# Patient Record
Sex: FEMALE | Race: AMERICAN INDIAN OR ALASKA NATIVE
[De-identification: names, ages, dates, MRNs, and addresses within clinical notes are randomized per-mention and may not be internally consistent; named-entity substitution may affect disease eponyms.]

---

## 2018-10-10 ENCOUNTER — HOSPITAL ENCOUNTER (EMERGENCY)
Dept: HOSPITAL 5 - ED | Age: 28
LOS: 1 days | Discharge: HOME | End: 2018-10-11
Payer: SELF-PAY

## 2018-10-10 VITALS — SYSTOLIC BLOOD PRESSURE: 125 MMHG | DIASTOLIC BLOOD PRESSURE: 61 MMHG
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
## 2018-10-10 DIAGNOSIS — S00.83XA: Primary | ICD-10-CM
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
## 2018-10-10 DIAGNOSIS — F17.200: ICD-10-CM

## 2018-10-10 DIAGNOSIS — F12.10: ICD-10-CM

## 2018-10-10 DIAGNOSIS — S80.02XA: ICD-10-CM

## 2018-10-10 PROCEDURE — 72100 X-RAY EXAM L-S SPINE 2/3 VWS: CPT

## 2018-10-10 PROCEDURE — 81025 URINE PREGNANCY TEST: CPT

## 2018-10-10 PROCEDURE — 70486 CT MAXILLOFACIAL W/O DYE: CPT

## 2018-10-10 PROCEDURE — 70450 CT HEAD/BRAIN W/O DYE: CPT
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
## 2019-04-30 ENCOUNTER — HOSPITAL ENCOUNTER (OUTPATIENT)
Dept: HOSPITAL 5 - OR | Age: 29
Discharge: HOME | End: 2019-04-30
Attending: OBSTETRICS & GYNECOLOGY
Payer: MEDICAID

## 2019-04-30 VITALS — DIASTOLIC BLOOD PRESSURE: 57 MMHG | SYSTOLIC BLOOD PRESSURE: 121 MMHG

## 2019-04-30 DIAGNOSIS — O02.1: Primary | ICD-10-CM

## 2019-04-30 DIAGNOSIS — Z98.890: ICD-10-CM

## 2019-04-30 DIAGNOSIS — Z91.040: ICD-10-CM

## 2019-04-30 DIAGNOSIS — Z79.899: ICD-10-CM

## 2019-04-30 DIAGNOSIS — F17.210: ICD-10-CM

## 2019-04-30 LAB
BASOPHILS # (AUTO): 0.1 K/MM3 (ref 0–0.1)
BASOPHILS NFR BLD AUTO: 0.8 % (ref 0–1.8)
EOSINOPHIL # BLD AUTO: 0.1 K/MM3 (ref 0–0.4)
EOSINOPHIL NFR BLD AUTO: 0.7 % (ref 0–4.3)
HCT VFR BLD CALC: 42 % (ref 30.3–42.9)
HGB BLD-MCNC: 13.8 GM/DL (ref 10.1–14.3)
LYMPHOCYTES # BLD AUTO: 1.7 K/MM3 (ref 1.2–5.4)
LYMPHOCYTES NFR BLD AUTO: 20.8 % (ref 13.4–35)
MCHC RBC AUTO-ENTMCNC: 33 % (ref 30–34)
MCV RBC AUTO: 89 FL (ref 79–97)
MONOCYTES # (AUTO): 0.7 K/MM3 (ref 0–0.8)
MONOCYTES % (AUTO): 8.4 % (ref 0–7.3)
PLATELET # BLD: 244 K/MM3 (ref 140–440)
RBC # BLD AUTO: 4.7 M/MM3 (ref 3.65–5.03)

## 2019-04-30 PROCEDURE — 59820 CARE OF MISCARRIAGE: CPT

## 2019-04-30 PROCEDURE — 86900 BLOOD TYPING SEROLOGIC ABO: CPT

## 2019-04-30 PROCEDURE — 86901 BLOOD TYPING SEROLOGIC RH(D): CPT

## 2019-04-30 PROCEDURE — 85025 COMPLETE CBC W/AUTO DIFF WBC: CPT

## 2019-04-30 PROCEDURE — 88305 TISSUE EXAM BY PATHOLOGIST: CPT

## 2019-04-30 PROCEDURE — 36415 COLL VENOUS BLD VENIPUNCTURE: CPT

## 2019-04-30 NOTE — ANESTHESIA CONSULTATION
Anesthesia Consult and Med Hx


Date of service: 04/30/19





- Airway


Anesthetic Teeth Evaluation: Good


ROM Head & Neck: Adequate


Mental/Hyoid Distance: Adequate


Mallampati Class: Class II


Intubation Access Assessment: Good





- Pulmonary Exam


CTA: Yes





- Cardiac Exam


Cardiac Exam: RRR





- Pre-Operative Health Status


ASA Pre-Surgery Classification: ASA2


Proposed Anesthetic Plan: General





- Pulmonary


Hx Smoking: Yes





- Central Nervous System


Hx Psychiatric Problems: No





- Other Systems


Hx Cancer: No

## 2019-04-30 NOTE — OPERATIVE REPORT
Operative Report


Operative Report: 

















DATE OF SURGERY: 19








PREOP Diagnosis


1. Missed 


2. Rh positive





PREOP Diagnosis


1. Missed 


2. Rh positive





Procedure: Suction Dilatation and Curettage





Findings


1. Anteverted uterus 8 wk size





Surgeon


1. Ronna Blanc MD





Anesthesia:


1. General





I/O:


EBL: 100ml


UOP: 5ml








Specimens removed:


1. Products of conception





Complications: none





Disposition: Patient taken to recovery room in stable condition





INDICATIONS:


The patient is a 29yo  that presented for suction D&C for a 6 week missed 

. She was offered medical treatment and declined. The patient was 

consented and the risks including but not limited to bleeding, infection, injury

to surrounding organs and uterine perforation were discussed. All questions were

answered and informed consent signed. 











PROCEDURE:


The patient was taken to OR3 in stable condition.  She was placed in the supine 

position.  She received Doxycycline 200mg IV and wore SCDs.  Adequate anesthesia

was achieved and the patient was placed in the dorsal lithotomy position using 

Marcial Stirrups. A bimanual exam revealed an 8wk anteverted uterus.  The patient 

was draped in the routine sterile fashion and a timeout was done. A red rubber 

was used to drain the bladder. A sterile speculum was placed per vagina and 

single-toothed tenaculum placed on the anterior lip of the cervix.  A Agosto 

dilator was used to dilate the cervix up to 21 [place].  A #8 curved cannula was 

then connected to the suction canister.  Adequate pressure was achieved with the

suction.  The cannula was then placed through the cervical os and products of 

conception was collected.     A #1   sharp curette was used to ensure the uterus

was gritty in all 4 quadrants.  Speculum and tenaculum were removed and noted to

be hemostatic.





Misoprostol 800mcg and Pitocin 10 units IM were administered at end of case. 





All counts were correct. 





The patient tolerated the procedure well and was awakened from anesthesia and 

taken to the recovery room.
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
